# Patient Record
Sex: MALE | Race: WHITE
[De-identification: names, ages, dates, MRNs, and addresses within clinical notes are randomized per-mention and may not be internally consistent; named-entity substitution may affect disease eponyms.]

---

## 2020-07-12 ENCOUNTER — HOSPITAL ENCOUNTER (INPATIENT)
Dept: HOSPITAL 46 - MS | Age: 25
LOS: 1 days | Discharge: HOME | DRG: 804 | End: 2020-07-13
Attending: SURGERY | Admitting: SURGERY
Payer: COMMERCIAL

## 2020-07-12 VITALS — BODY MASS INDEX: 19.46 KG/M2 | HEIGHT: 71 IN | WEIGHT: 139 LBS

## 2020-07-12 DIAGNOSIS — R59.0: Primary | ICD-10-CM

## 2020-07-12 DIAGNOSIS — L73.9: ICD-10-CM

## 2020-07-12 PROCEDURE — 3E1M38X IRRIGATION OF PERITONEAL CAVITY USING IRRIGATING SUBSTANCE, PERCUTANEOUS APPROACH, DIAGNOSTIC: ICD-10-PCS | Performed by: SURGERY

## 2020-07-12 PROCEDURE — 0DTJ4ZZ RESECTION OF APPENDIX, PERCUTANEOUS ENDOSCOPIC APPROACH: ICD-10-PCS | Performed by: SURGERY

## 2020-07-12 PROCEDURE — 07BB4ZX EXCISION OF MESENTERIC LYMPHATIC, PERCUTANEOUS ENDOSCOPIC APPROACH, DIAGNOSTIC: ICD-10-PCS | Performed by: SURGERY

## 2020-07-12 NOTE — NUR
PATIENT RESTING IN BED. VISITOR IN ROOM. VITAL SIGNS DONE BY RN. I&O DONE.
JEDANIELEO GIVEN. CALL LIGHT WITHIN REACH. NO OTHER NEEDS AT THIS TIME

## 2020-07-12 NOTE — NUR
SCHEDULED IV ABX INFUSING, SITE WNL. JELLO AND CHICKEN BROTH PROVIDED PER
REQUEST, PT DENIES NAUSEA. ABLE TO VOID 50MLS AT THIS TIME, WILL MONITOR.
GIRLFRIEND IN ROOM, DENIES FURTHER NEEDS, CALL LIGHT IN REACH.

## 2020-07-12 NOTE — NUR
SHIFT REPORT RECEIVED FROM DAYSHIFT SLIM BELTRAN AT BEDSIDE. PT AWAKE AND
RESTING IN BED, LAP SITES X3 WITH SCANT DRY SEROSANGUINEOUS DRAINAGE NOTED. PT
DENIES NEEDS, CALL LIGHT IN REACH.

## 2020-07-12 NOTE — NUR
ASSESSMENT COMPLETE, SCHEDULED MEDS GIVEN (SEE EMAR). LAP SITES X3 NOTED,
SITES UNCHANGED. PT REPORTS TOLERABLE 2-3/10 PAIN, DENIES NAUSEA. SCD'S IN
PLACE, DENIES FURTHER NEEDS. CALL LIGHT IN REACH.

## 2020-07-12 NOTE — NUR
POST-OP VSS, PT RESTING IN BED. EDUCATION GIVEN ON CURRENT CLEAR LIQUID DIET.
NO FURTHER NEEDS, CALL LIGHT IN REACH.

## 2020-07-12 NOTE — NUR
PT RESTING IN BED, AWAKE. ATTEMPTING TO VOID VIA URINAL. DENIES NEEDS OR
CONCERNS. CALL LIGHT IN REACH.

## 2020-07-12 NOTE — NUR
PT GIVEN INSTRUCTIONS ON WIPE DOWN. LAB IN TO DRAW CULTURES. PT RATES PAIN
4\10 AFTER TORADOL. SECOND BOLUS RUNNING. AB FINISHED.

## 2020-07-12 NOTE — NUR
PT TO FLOOR WITH AIR CREW. SCOOTED OVER TO BED HIMSELF. RATES PAIN 8\10 GIVEN
TORADOL. BEDSIDE XRAY DONE. BOLUS RUNNING.

## 2020-07-13 NOTE — NUR
FOLLOW UP TO CNA REPORTING PT WITH NO VOID, SPOKE TO PT AND GIRLFRIEND AND HE
STATED HE DID VOID BUT FORGOT TO USE URINAL AFTER WALKING, STATED HE WOULD
MAKE SURE HE DID FROM NOW ON. DENIES ANY NEEDS IN [facility].

## 2020-07-13 NOTE — NUR
PATIENT SLEEPY IN BED. GIRLFRIEND IN ROOM. VITAL SIGNS AND I&O DONE. PATIENT
DID NOT VOID DURING THIS PERIOD. RN NOTIFIED. CALL LIGHT WITHIN REACH. NO
OTHER NEEDS AT THIS TIME

## 2020-07-13 NOTE — NUR
ASSESSMENT COMPLETE, NO NEW CHANGES OR CONCERNS. PT AWOKE EASILY TO VOICE,
DENIES PAIN AT REST REPORTS TOELRABLE 2/10 PAIN WITH MOVEMENT. SCD'S IN PLACE.
VSS, NO CHANGE IN LAP SITES. CPOX IN PLACE. JELLO PROVIDED PER REQUEST. NO
FURTHER NEEDS, CALL LIGHT IN REACH.

## 2020-07-13 NOTE — HP
[facility]
                                    [address], [place]  [address]
_________________________________________________________________________________________
                                                                 Signed   
 
 
ADMISSION DATE:  
07/12/2020
 
REASON FOR ADMISSION:  
Acute abdomen.
 
HISTORY OF PRESENT ILLNESS:  
This 25-year-old white man began having rather severe right-sided abdominal pain
approximately 3:00 a.m.  He presented to the emergency room at [place], [place], was
evaluated by Dr. Stanislaw Beebe at approximately 11:00 a.m.  His pain was severe and
accompanied with rising and fair amount of drama.  He had associated symptoms of nausea
and vomiting several times through the night and any attempt at ingestion of water was
met with regurgitation. 
 
His evaluation in [place] included a CT scan of the abdomen which showed no sign of
acute appendicitis so that was the initial clinical diagnosis.  He was noted to have an
elevated serum lactate of 6.7 (normal up to 2.2).  He was afebrile with a temperature
97.8, heart rate initially of 126, O2 saturations 98% on room air. 
 
The patient is extremely thin and the findings on the CT scan are confounded by lack of
intraabdominal fat. 
 
Evaluation included a urinalysis, which was essentially normal except for 3+ ketones.
His serum glucose was 82.  He had blood in urine considered 1+.  There was no sign of
hydroureter on the CT scan. 
 
His white cell count was __________, neutrophils 84.  His Chem profile showed elevated
sodium of 147, potassium of 4.3, CO2 of 17, glucose of 82, as mentioned, creatinine of
0.9.  Liver enzymes normal.  Serum calcium elevated slightly at 10.3.  Creatine kinase
of 459 (normal to 170). 
 
He was accepted in transport for surgical evaluation.
 
PAST MEDICAL HISTORY:  
Notable for a head trauma in the past in 2019, for which CT scan was negative.  He
denies any chronic or ongoing medical problems. 
 
SOCIAL HISTORY:  
He is single.  His girlfriend is in [place] and that is the reason he was in [place] in
the early morning hours.  He works at [employer] as a cook.  He lives in
[place].  He lives alone. 
 
 
    Electronically Signed By: EDNA CARLSON MD  07/13/20 1638
_________________________________________________________________________________________
PATIENT NAME:     STONEY VALDEZ                       
MEDICAL RECORD #: [id number]            HISTORY AND PHYSICAL          
          ACCT #: [id number]  
DATE OF BIRTH:   [birth year]            REPORT #: [id number]      
PHYSICIAN:        EDNA CARLSON MD                 
PCP:              NO PRIMARY CARE PHYSICIAN     
REPORT IS CONFIDENTIAL AND NOT TO BE RELEASED WITHOUT AUTHORIZATION
 
 
                                  [facility]
                                    [address], [place]  [address]
_________________________________________________________________________________________
                                                                 Signed   
 
 
REVIEW OF SYSTEMS:  
He denies any chest or neck pain.  His pain is mostly right-sided and diffuse and
severe.  He denies any prior history of peptic ulcer disease. 
 
PHYSICAL EXAMINATION:
GENERAL:  Very thin white man, who looks to be in extreme distress. 
HEENT:  He has very dry mucous membranes.  Trachea is midline.  He has no crepitus of
the neck. 
CHEST:  Shows mild tachypnea. 
ABDOMEN:  Quite firm.  Tenderness is noted throughout. 
EXTREMITIES:  Show no petechiae, clubbing, or cyanosis.
 
LAB STUDIES:  
As previously reported. 
 
Chest x-ray has been ordered here to assess for free air.
 
ASSESSMENT:  
The patient has abdominal pain far in excess what I would expect for appendicitis and
although, I have not reviewed the CT scan, it has been reported as normal as interpreted
by Dr. Grimes.  The lack of fat due to his thin body habitus obscures tissue planes
making diagnosis even of appendicitis challenging.  His symptoms are far more suggestive
of perforated ulcer to me, but a chest x-ray is pending and will additionally review the
CT scan more fully when loaded into our system. 
 
We will initiate fluid infusion to include lactated Ringer's solution immediately and he
will be started on meropenem broad-spectrum antibiotic 1 g IV.  A lactate level will be
checked per protocol for sepsis.  Most likely he will require exploration of the abdomen
either laparoscopically or otherwise.  I discussed all this with the patient and he
understands and agrees. 
 
 
 
            ________________________________________
            Edna Carlson MD 
 
 
JM/MODL
Job #:  [id number]
DD:  07/12/2020 12:40:09
DT:  07/12/2020 13:26:16
 
 
    Electronically Signed By: EDNA CARLSON MD  07/13/20 1638
_________________________________________________________________________________________
PATIENT NAME:     STONEY VALDEZ                       
MEDICAL RECORD #: [id number]            HISTORY AND PHYSICAL          
          ACCT #: [id number]  
DATE OF BIRTH:   [birth year]            REPORT #: [id number]      
PHYSICIAN:        EDNA CARLSON MD                 
PCP:              NO PRIMARY CARE PHYSICIAN     
REPORT IS CONFIDENTIAL AND NOT TO BE RELEASED WITHOUT AUTHORIZATION
 
 
                                  [facility]
                                    [address] [place]  [address]
_________________________________________________________________________________________
                                                                 Signed   
 
 
cc:            Dr. Stanislaw Beebe
 
 
Copies:                                
~
 
 
 
 
 
 
 
 
 
 
 
 
 
 
 
 
 
 
 
 
 
 
 
 
 
 
 
 
 
 
 
 
 
 
 
 
 
 
    Electronically Signed By: EDNA CARLSON MD  07/13/20 1638
_________________________________________________________________________________________
PATIENT NAME:     STONEY VALDEZ                       
MEDICAL RECORD #: [id number]            HISTORY AND PHYSICAL          
          ACCT #: [id number]  
DATE OF BIRTH:   [birth year]            REPORT #: [id number]      
PHYSICIAN:        EDNA CARLSON MD                 
PCP:              NO PRIMARY CARE PHYSICIAN     
REPORT IS CONFIDENTIAL AND NOT TO BE RELEASED WITHOUT AUTHORIZATION

## 2020-07-13 NOTE — NUR
DISCHARGE INSTRUCTION GIVEN TO PATIENT, VERBALIZES UNDERSTANDING OF
MEDICATIONS AND FOLLOW UP APPOINTMENT, DENIES QUESTIONS OR CONCERNS.

## 2020-07-13 NOTE — NUR
PT VOIDED 150MLS CLEAR YELLOW URINE, IV ABX INFUSING. SITE REMAINS WNL. PT
DENIES NEEDS OR CONCERNS. CALL LIGHT IN REACH.

## 2020-07-13 NOTE — NUR
PT WOKE EASILY FOR ASSESSMENT, DENIES NAUSEA, STATES HE IS PAINFUL WHEN MOVES
MUCH BUT COMFORTABLE AT REST, ASKED TO SLEEP A BIT LONGER.

## 2020-07-13 NOTE — OR
[facility]
                                    [address], [place]  [address]
_________________________________________________________________________________________
                                                                 Signed   
 
 
DATE OF OPERATION:
07/12/2020
 
SURGEON:
Edna Carlson MD
 
PREOPERATIVE DIAGNOSES:
1. Acute abdomen, severe abdominal pain and tenderness.
2. Elevated lactic acid, creatine kinase and elevated white count.
 
POSTOPERATIVE DIAGNOSES:
1. No findings in abdomen of acute process; mesenteric adenopathy.
2. Submental inflammatory focus (chin area) without purulence.
 
PROCEDURES:
1. Exploratory laparoscopy.
2. Laparoscopic appendectomy.
3. Laparoscopic mesenteric lymph node biopsy.
4. Laparoscopic lavage with collection of fluid for Gram stain and culture.
 
ANESTHESIA:
General endotracheal; Micah Yg, CRNA, and local 10 mL of 0.25% Marcaine with
epinephrine. 
 
INDICATION:
This is a 25-year-old very thin white man is referred emergently by Dr. Beebe of
[place], [place].  The patient lives in [place], though he was visiting [place] to his
girlfriend's house.  He began having severe abdominal pain approximately 3 a.m. in the
morning.  The patient denies any drug use and evaluation in the mid morning by Dr. Beebe included findings showing severe peritonitis suggestive of appendicitis.  A
CT scan was performed, which showed no sign of acute process.  Differentiation of the
appendix was not forthcoming as he has very thin and has essentially no fat planes.  He
was noted to have an elevated lactic acid of 6.9 [place] [id number].  Liver
enzymes were normal.  Urinalysis showed 1+ blood.  There is no sign of hydroureter on
the CT scan.  He was transferred and received for consideration of surgical abdomen.  He
was noted to have nonfocal tenderness and right-sided abdominal pain complaints.  His
amylase and lipase levels were found to be normal.  A lactic acid level was repeated and
found to be 2.2.  A chest x-ray was performed showing no infiltrate and no sign of free
air.  He did have improvement of his pain with Toradol intravenously administered and he
was administered broad-spectrum antibiotic upon arrival (meropenem).  Uncertain as to
his diagnosis, I have recommended laparoscopy and probable appendectomy and other
indicated procedures. 
 
    Electronically Signed By: EDNA CARLSON MD  07/13/20 1638
_________________________________________________________________________________________
PATIENT NAME:     STONEY VALDEZ                       
MEDICAL RECORD #: [id number]            OPERATIVE REPORT              
          ACCT #: [id number]  
DATE OF BIRTH:   [birth year]            REPORT #: [id number]      
PHYSICIAN:        EDNA CARLSON MD                 
PCP:              NO PRIMARY CARE PHYSICIAN     
REPORT IS CONFIDENTIAL AND NOT TO BE RELEASED WITHOUT AUTHORIZATION
 
 
                                  [facility]
                                    [address], [place]  [address]
_________________________________________________________________________________________
                                                                 Signed   
 
 
 
Additional note, he has an erythematous skin lesion in the submental area without sign
of purulence.  The patient attributes this to dental problems, though oral examination
shows no sign of abscess or tooth caries at least corresponding to this area in the
mandible. 
 
The patient understands the risks of bleeding, infection, failure of diagnosis,
misdiagnosis, and need for other indicated procedures including open surgery and wishes
to proceed. 
 
FINDINGS:
He had no sign of generalized peritonitis, purulence or sign of perforated viscus.
There was no sign of small-bowel obstruction.  The small bowel was normal from the
ligament of Treitz to the terminal ileum.  There were mesenteric lymph nodes that were
enlarged in the region of the right mesocolon and ileocolic area.  A representative node
was excised.  Appendix itself was soft, nondilated, and not acutely inflamed.  The right
colon, transverse, and sigmoidal areas of colon showed no sign of inflammation.  There
was no creeping fat associated with the small bowel and retroperitoneum had no evidence
of inflammatory focus or hemorrhage.  The right kidney was manipulated with an
instrument and appeared to have normal contour.  The gallbladder was normal though
mildly distended.  The liver was normal.  There was no sign of perforated duodenal ulcer
or gastric ulcer. 
 
In addition to mesenteric lymph node biopsy and appendectomy, peritoneal lavage was
undertaken and fluid obtained for Gram stain and culture. 
 
DESCRIPTION OF PROCEDURE:
The patient was brought to the operating room and given a general endotracheal
anesthetic.  He voided immediately prior to operation and therefore, Camarena catheter was
not placed.  His left arm was placed at the side.  Preoperative antibiotic meropenem had
been administered already.  The abdomen was clipped and prepared with a chlorhexidine
solution and draped sterilely.  An infraumbilical incision was made and the abdomen
entered without problem.  He had a thin abdominal wall.  Pneumoperitoneum was achieved
to a level of 14 mmHg of carbon dioxide gas.  Intraabdominal inspection showed no sign
of ascites, carcinomatosis, purulence or perforated viscus.  The appendix was obscured
from view.  The cecum appeared normal.  There were some adhesions that were normal
associated with right colon.  The liver appeared normal.  The gallbladder was obscured
from view.  An epigastric 12 mm port was placed and a camera placed to that site.
Single hand manipulation allowed for visualization of the appendix which appeared thin,
nondilated, and not acutely inflamed.  An additional right lower quadrant 5 mm trocar
was placed and two-hand manipulation undertaken showing the appendix essentially to be
normal.  As per preoperative discussion, appendectomy was anticipated whether the
 
    Electronically Signed By: EDNA CARLSON MD  07/13/20 1638
_________________________________________________________________________________________
PATIENT NAME:     STONEY VALDEZ                       
MEDICAL RECORD #: [id number]            OPERATIVE REPORT              
          ACCT #: [id number]  
DATE OF BIRTH:   [birth year]            REPORT #: [id number]      
PHYSICIAN:        EDNA CARLSON MD                 
PCP:              NO PRIMARY CARE PHYSICIAN     
REPORT IS CONFIDENTIAL AND NOT TO BE RELEASED WITHOUT AUTHORIZATION
 
 
                                  [facility]
                                    [address], [place]  [address]
_________________________________________________________________________________________
                                                                 Signed   
 
 
appendix appeared normal or not.  The terminal ileum appeared normal as noted by the
antimesenteric fat pad of tree.  The small bowel was run from the terminal ileum more
proximally as far as possible showing no sign of Meckel's diverticulum, creeping fat to
suggest inflammatory bowel disease nor sign of perforation, duplication, or other
problem.  There was some mesenteric adenopathy of the ileocolic mesentery.  This was
manipulated gently.  A lymph node was identified and the peritoneum overlying was
incised __________ and the mesenteric lymph node dissected free with meticulous care
excising it completely.  It measured nearly 2 cm in size.  It was extracted through the
port and passed for pathology. 
 
There was no evidence of perforated viscus in the region of the duodenum, the
gallbladder, though mildly distended and not acutely inflamed, liver was normal.  There
was no sign of perforated gastric or duodenal ulcer. 
 
Attention was turned towards the appendix.  The appendix was elevated and a window
created between the appendix and the cecum with blunt electrocautery dissection.  An
Endo-THANG stapling device was used to transect the appendix at its base.  The mesentery
was then transected similarly with good hemostatic effect.  The appendix was withdrawn
into the trocar sheath and extracted and passed for pathology.  Reinspection throughout
the abdomen was undertaken showing the mesenteric lymph node biopsy site to be
hemostatic.  The sigmoid colon was carefully examined and found to have no sign of
diverticulitis or inflammation, the pelvis appeared normal otherwise.  The
retroperitoneum was examined by reflecting the cecum as well as the hepatic flexure.
The right kidney was easily palpated with the instruments and appeared to have a normal
contour.  No sign of retroperitoneal hematoma or abscess.  Peritoneal lavage was
undertaken with saline solution and excess fluid suctioned free in a portion sent for
Gram stain and culture.  There were no acute findings other than the mesenteric
adenopathy that would account for his pain and no further intervention was deemed
warranted at this point. 
 
The trocars removed under direct visualization showing no sign of bleeding.  The
infraumbilical fascial incision was reapproximated with interrupted 0 Vicryl suture.  A
10 mL of 0.25% Marcaine with epinephrine injected locally in each trocar site.  The skin
was closed with interrupted 3-0 Vicryl.  Steri-Strips were applied.  He was extubated
and transferred to the recovery room in good condition having suffered no complications.
 Sponge, needle, and instrument counts were reported as correct x3. 
 
There remains enigmatic as to the source of his severe abdominal pain, elevated creatine
kinase, low-grade hematuria, elevated lactic acid level and white count.  Nonsurgical
conditions __________ this setting might include black  spider bite, though that
was not reported by the patient or others.  We will review this further with the
patient. 
 
    Electronically Signed By: EDNA CARLSON MD  07/13/20 1638
_________________________________________________________________________________________
PATIENT NAME:     STONEY VALDEZ                       
MEDICAL RECORD #: [id number]            OPERATIVE REPORT              
          ACCT #: [id number]  
DATE OF BIRTH:   [birth year]            REPORT #: [id number]      
PHYSICIAN:        EDNA CARLSON MD                 
PCP:              NO PRIMARY CARE PHYSICIAN     
REPORT IS CONFIDENTIAL AND NOT TO BE RELEASED WITHOUT AUTHORIZATION
 
 
                                  [address]  [address]
_________________________________________________________________________________________
                                                                 Signed   
 
 
 
Of note, an erythematous skin lesion in the submental area associated with his beard,
did not appear typical of a spider bite or other bite, but rather a folliculitis and not
with apparent abscess formation.  Further investigation as the enigmatic source of his
signs and symptoms will be forthcoming. 
 
 
 
            ________________________________________
            MD ROSALIND Brunner/MODL
Job #:  [id number]
DD:  07/12/2020 16:57:30
DT:  07/13/2020 04:24:10
 
cc:            Dr. Stanislaw Beebe
 
 
Copies:                                
~
 
 
 
 
 
 
 
 
 
 
 
 
 
 
 
 
 
 
 
 
 
    Electronically Signed By: EDNA CARLSON MD  07/13/20 1638
_________________________________________________________________________________________
PATIENT NAME:     STONEY VALDEZ                       
MEDICAL RECORD #: [id number]            OPERATIVE REPORT              
          ACCT #: [id number]  
DATE OF BIRTH:   [birth year]            REPORT #: [id number]      
PHYSICIAN:        EDNA CARLSON MD                 
PCP:              NO PRIMARY CARE PHYSICIAN     
REPORT IS CONFIDENTIAL AND NOT TO BE RELEASED WITHOUT AUTHORIZATION

## 2020-07-13 NOTE — NUR
PT IN DARKENED RM, AWAKES TO VOICE. BASICALLY, PT GAVE ME HAND GESTURE ANSWERS
TO MY QUESTIONS. GIRL FRIEND LENI WOULD ANSWER MOSTLY FOR PT. ENLIGHTENED LENI
REGARDING VISITATION. PLANNED TO HEAD TO [place] FOR LUNCH. PT HAD NOT EATEN
ANYTHING YET. WILL HAVE SOLID FOOD FOR LUNCH-WILL DETERMINE DC. GAVE BLESSING,
WILL FOLLLOW AS NEEDED

## 2020-07-13 NOTE — NUR
PATIENT RESTING IN BED. RN AND GIRLFRIEND IN ROOM. THE FINAL VITAL SIGNS WERE
OBTAINED PRIOR TO DISCHARGE FROM THE UNIT.

## 2020-07-13 NOTE — NUR
ATE 50% OF LUNCH, TAKING PO FLUIDS WELL. UP TO BATHROOM INDEP. GIRLFRIEND BACK
IN ROOM, WATCHING TV.

## 2020-07-13 NOTE — NUR
VSS AND I&O'S DONE. PT UP SBA AND WALKED HALF LOOP IN HALLWAY, TOLERATED WELL.
PREMEDICATED WITH PRN TORADOL FOR 4/10 PAIN. NO FURTHER NEEDS, CALL LIGHT IN
REACH.

## 2020-07-13 NOTE — NUR
PATIENT SLEEPING. GIRLFRIEND IN ROOM. I&O DONE. PATIENT DID NOT VOID OR  EAT
DURING THIS PERIOD. RN NOTIFIED. CALL LIGHT WITHIN REACH. NO OTHER NEEDS AT
THIS TIME

## 2020-07-17 NOTE — PATH
[facility]
                                    [address] [place], [place]  [address]
_________________________________________________________________________________________
                                                                 Signed   
 
 
 
SPECIMEN(S): A APPENDIX
SPECIMEN(S): B LYMPH NODE BIOPSY
 
SPECIMEN SOURCE:
A. APPENDIX
B. LYMPH NODE BIOPSY
 
CLINICAL HISTORY:
Abdominal pain.
 
FINAL PATHOLOGIC DIAGNOSIS:
A.  Appendix, appendectomy:
-  Fibrous obliteration of distal appendiceal lumen.
-  No evidence of appendicitis.
B.  Mesenteric lymph node, excision:
-  Benign reactive lymph node with progressive transformation of germinal 
center. 
-  Negative for malignancy.
LJA:NA:cml:vlg:C2NR
 
MICROSCOPIC EXAMINATION:
A.  Histologic sections of all submitted blocks are examined by light 
microscopy.  These findings, together with the gross examination, support the 
pathologic diagnosis. 
B.  Immunohistochemical stains are performed (with appropriate reactive 
controls) and show the following results: 
CD20:  Positive in many follicles showing normal pattern of distribution.
CD3:  Positive in T lymphocytes showing normal distribution.
BCL2:  Negative in reactive germinal center, positive in many T lymphocytes.
BCL6:  Highlights many germinal centers of follicles with normal pattern and 
distribution. 
CD15:  Highlights scattered leukocytes, negative for large atypical cells.
CD30:  Positive in scattered immunoblasts, negative for large atypical cells.
LAKISHA.1:  Shows normal distribution of B cells within germinal centers, negative 
for large atypical cells. 
NA:vlg
 
GROSS DESCRIPTION:
Two specimens are received in two containers, labeled "WK."
A.  The specimen, labeled "WK," and designated on the requisition "appendix," 
is received in formalin and consists of 
 
                                                                                    
_________________________________________________________________________________________
PATIENT NAME:     STONEY VALDEZ II                    
MEDICAL RECORD #: [id number]            PATHOLOGY                     
          ACCT #: [id number]       ACCESSION #: [id number]     
DATE OF BIRTH:   [birth year]            REPORT #: [id number]       
PHYSICIAN:        KATHARINA CASTILLO              
PCP:              NO PRIMARY CARE PHYSICIAN     
REPORT IS CONFIDENTIAL AND NOT TO BE RELEASED WITHOUT AUTHORIZATION
 
 
                                  [facility]
                                    [address], [place]  [address]
_________________________________________________________________________________________
                                                                 Signed   
 
 
Specimen: Appendix with mesoappendix.
Dimensions: 5.6 x 2.5 x 0.7 cm.
Serosa: Pink-tan to hemorrhagic.
Perforation: Not grossly identified.
Inking: Staple line is inked black.
Mucosa: Pink-tan to hemorrhagic.
Fecalith: Not grossly identified.
Additional: None.
Representative sections are submitted in cassette (A1).
B.  The specimen, labeled "WK," and designated on the requisition "mesenteric 
lymph node," is received in formalin and consists of one previously 
cut/slightly disrupted, pink-tan possible lymph node 
(1.7 x 1.4 x 0.5 cm).  The specimen is serially sectioned to reveal a pink-tan 
to white-tan cut surface.  The specimen is submitted entirely in cassette B1. 
AC (under the direct supervision of a pathologist)
 
The Gross Description was prepared using a voice recognition system.  The 
report was reviewed for accuracy; however, sound-alike word errors, addition 
and/or deletions may occur.  If there is any 
question about this report, please contact Client Services.
 
ADDITIONAL NOTES:
Immunohistochemical and/or in situ hybridization studies were performed on this 
case with the appropriate positive controls that react as expected.  This test 
was developed and its performance 
characteristics determined by [facility].  It has not been cleared or 
approved by the U.S. Food and Drug Administration.  The FDA has determined that 
such clearance or approval is not 
necessary.  This test is used for clinical purposes.  It should not be regarded 
as investigational or for research.  [facility] is certified under the 
Clinical Laboratory Improvement 
Amendments of 1988 (CLIA) as qualified to perform high complexity clinical 
laboratory testing. 
 
PERFORMING LABORATORY:
The technical component was performed by [facility], [address] [place] [address] (Medical Director: Honey Peraza MD; CLIA# [id number]). 
Professional interpretation [facility] performed by 
[facility], [address] [address] (CLIA# [id number]). [facility] 
[facility], [address] [place] [address] (Medical Director:  Les 
 
                                                                                    
_________________________________________________________________________________________
PATIENT NAME:     STONEY VALDEZ WILMER ABDALLA                    
MEDICAL RECORD #: [id number]            PATHOLOGY                     
          ACCT #: [id number]       ACCESSION #: [id number]     
DATE OF BIRTH:   [birth year]            REPORT #: [id number]       
PHYSICIAN:        KATHARINA PATHOLOGY              
PCP:              NO PRIMARY CARE PHYSICIAN     
REPORT IS CONFIDENTIAL AND NOT TO BE RELEASED WITHOUT AUTHORIZATION
 
 
                                  [facility]
                                    [address], [place]  [address]
_________________________________________________________________________________________
                                                                 Signed   
 
 
DO Alexandre; CLIA#:  [id number]. 
 
Diagnostician:  Corbin Paez MD
Pathologist
Diagnostician:  Sushil Craig MD, FACP
Pathologist
Electronically Signed 07/17/2020
 
 
Copies:                                
~
 
 
 
 
 
 
 
 
 
 
 
 
 
 
 
 
 
 
 
 
 
 
 
 
 
 
 
 
 
 
 
 
                                                                                    
_________________________________________________________________________________________
PATIENT NAME:     STONEY VALDEZ II                    
MEDICAL RECORD #: [id number]            PATHOLOGY                     
          ACCT #: [id number]       ACCESSION #: [id number]     
DATE OF BIRTH:   [birth year]            REPORT #: [id number]       
PHYSICIAN:        KATHARINA PATHOLOGY              
PCP:              NO PRIMARY CARE PHYSICIAN     
REPORT IS CONFIDENTIAL AND NOT TO BE RELEASED WITHOUT AUTHORIZATION